# Patient Record
Sex: FEMALE | Race: WHITE | Employment: UNEMPLOYED | URBAN - METROPOLITAN AREA
[De-identification: names, ages, dates, MRNs, and addresses within clinical notes are randomized per-mention and may not be internally consistent; named-entity substitution may affect disease eponyms.]

---

## 2017-07-05 ENCOUNTER — OFFICE VISIT (OUTPATIENT)
Dept: INTERNAL MEDICINE CLINIC | Age: 47
End: 2017-07-05

## 2017-07-05 VITALS
RESPIRATION RATE: 18 BRPM | BODY MASS INDEX: 27.64 KG/M2 | DIASTOLIC BLOOD PRESSURE: 74 MMHG | TEMPERATURE: 98.6 F | OXYGEN SATURATION: 98 % | HEIGHT: 63 IN | WEIGHT: 156 LBS | HEART RATE: 65 BPM | SYSTOLIC BLOOD PRESSURE: 109 MMHG

## 2017-07-05 DIAGNOSIS — F43.9 SITUATIONAL STRESS: Primary | ICD-10-CM

## 2017-07-05 DIAGNOSIS — E07.9 THYROID DISEASE: ICD-10-CM

## 2017-07-05 RX ORDER — LEVOTHYROXINE SODIUM 50 UG/1
50 TABLET ORAL
Qty: 90 TAB | Refills: 0 | Status: SHIPPED | OUTPATIENT
Start: 2017-07-05 | End: 2018-01-01 | Stop reason: SDUPTHER

## 2017-07-05 RX ORDER — LEVOTHYROXINE SODIUM 50 UG/1
50 TABLET ORAL
Qty: 90 TAB | Refills: 0 | Status: SHIPPED | OUTPATIENT
Start: 2017-07-05 | End: 2017-07-05 | Stop reason: SDUPTHER

## 2017-07-05 NOTE — MR AVS SNAPSHOT
Visit Information Date & Time Provider Department Dept. Phone Encounter #  
 7/5/2017 11:45 AM Tamara Fuentes MD [facility] [address] [id number] Upcoming Health Maintenance Date Due DTaP/Tdap/Td series (1 - Tdap) 10/3/1990 PAP AKA CERVICAL CYTOLOGY 4/18/2016 INFLUENZA AGE 9 TO ADULT 8/1/2017 Allergies as of 7/5/2017  Review Complete On: 7/5/2017 By: Tamara Fuentes MD  
 No Known Allergies Current Immunizations  Reviewed on 11/17/2016 Name Date Influenza Vaccine 11/14/2016 Not reviewed this visit You Were Diagnosed With   
  
 Codes Comments Situational stress    -  Primary ICD-10-CM: F43.9 ICD-9-CM: V62.89 Thyroid disease     ICD-10-CM: E07.9 ICD-9-CM: 246. 9 Vitals BP Pulse Temp Resp Height(growth percentile) Weight(growth percentile) 109/74 65 98.6 °F (37 °C) (Oral) 18 5' 3\" (1.6 m) 156 lb (70.8 kg) SpO2 BMI OB Status Smoking Status 98% 27.63 kg/m2 IUD Never Smoker Vitals History BMI and BSA Data Body Mass Index Body Surface Area  
 27.63 kg/m 2 1.77 m 2 Preferred Pharmacy Pharmacy Name Phone [id number]- [place], [address] AT [address] [phone] Your Updated Medication List  
  
   
This list is accurate as of: 7/5/17 12:31 PM.  Always use your most recent med list.  
  
  
  
  
 aspirin 81 mg tablet Take 81 mg by mouth. atorvastatin 40 mg tablet Commonly known as:  LIPITOR Take 1 Tab by mouth daily. citalopram 20 mg tablet Commonly known as:  Cloyce Diego Take  by mouth daily. clonazePAM 0.5 mg tablet Commonly known as:  Ly Mortimer Take 1/2 to 1 tab po tid prn. Caution may cause sedation ISOtretinoin 40 mg capsule Commonly known as:  ACCUTANE Take  by mouth.  
  
 levothyroxine 50 mcg tablet Commonly known as:  SYNTHROID  
 Take 1 Tab by mouth Daily (before breakfast). venlafaxine-SR 75 mg capsule Commonly known as:  EFFEXOR-XR Take 1 Cap by mouth daily. Prescriptions Sent to Pharmacy Refills  
 levothyroxine (SYNTHROID) 50 mcg tablet 0 Sig: Take 1 Tab by mouth Daily (before breakfast). Class: Normal  
 Pharmacy: [address] #: [phone] Route: Oral  
  
We Performed the Following REFERRAL TO PSYCHIATRY [REF91 Custom] To-Do List   
 07/05/2017 Lab:  TSH 3RD GENERATION   
  
 09/05/2017 Lab:  TSH 3RD GENERATION Referral Information Referral ID Referred By Referred To  
  
 [id number] Tristan Gilliland [address] [facility] [address] [place] [address] Phone: [phone] Fax: [phone] Visits Status Start Date End Date 1 New Request 7/5/17 7/5/18 If your referral has a status of pending review or denied, additional information will be sent to support the outcome of this decision. Patient Instructions Thyroid-Stimulating Hormone (TSH) Test: About This Test 
What is it? A thyroid-stimulating hormone (TSH) test is one of several blood tests used to check for thyroid gland problems. TSH causes the thyroid gland to make other important hormones that help control your body's metabolism. Why is this test done? This test is done to: · Find out whether the thyroid gland is working properly. · Find out if a problem with the thyroid is causing symptoms such as tiredness, weight gain, or weight loss. · Keep track of how well thyroid treatment is working. How can you prepare for the test? 
· In general, you dont need to prepare before having this test. Your doctor may give you some specific instructions. What happens during the test? 
· A health professional takes a sample of your blood. What else should you know about the test? 
· This test may be done at the same time as tests to measure other thyroid hormones. · Your results will include an explanation of what a \"normal\" result is. This is called a \"reference range. \" It is just a guide. Your doctor will evaluate your results based on your health and other factors. This means that a value that falls outside the normal values listed may still be normal for you. How long does the test take? · The test will take a few minutes. What happens after the test? 
· You will probably be able to go home right away. · You can go back to your usual activities right away. Follow-up care is a key part of your treatment and safety. Be sure to make and go to all appointments, and call your doctor if you are having problems. It's also a good idea to keep a list of the medicines you take. Ask your doctor when you can expect to have your test results. Where can you learn more? Go to http://ashley-mark.info/. Enter O695 in the search box to learn more about \"Thyroid-Stimulating Hormone (TSH) Test: About This Test.\" Current as of: July 28, 2016 Content Version: 11.3 © 9112-2390 Textbook[employer].com Textbook Time. Care instructions adapted under license by [employer] (which disclaims liability or warranty for this information). If you have questions about a medical condition or this instruction, always ask your healthcare professional. [place] [address] any warranty or liability for your use of this information. Introducing [facility]! Dear Coy Larson: Thank you for requesting a SkyRank account. Our records indicate that you already have an active SkyRank account. You can access your account anytime at [email or web address] Did you know that you can access your hospital and ER discharge instructions at any time in SkyRank?   You can also review all of your test results from your hospital stay or ER visit. Additional Information If you have questions, please visit the Frequently Asked Questions section of the [employer] website at [email or web address] Remember, [employer] is NOT to be used for urgent needs. For medical emergencies, dial 911. Now available from your iPhone and Android! Please provide this summary of care documentation to your next provider. Your primary care clinician is listed as Valerie Saha. If you have any questions after today's visit, please call [phone].

## 2017-07-05 NOTE — PATIENT INSTRUCTIONS
Thyroid-Stimulating Hormone (TSH) Test: About This Test  What is it? A thyroid-stimulating hormone (TSH) test is one of several blood tests used to check for thyroid gland problems. TSH causes the thyroid gland to make other important hormones that help control your body's metabolism. Why is this test done? This test is done to:  · Find out whether the thyroid gland is working properly. · Find out if a problem with the thyroid is causing symptoms such as tiredness, weight gain, or weight loss. · Keep track of how well thyroid treatment is working. How can you prepare for the test?  · In general, you dont need to prepare before having this test. Your doctor may give you some specific instructions. What happens during the test?  · A health professional takes a sample of your blood. What else should you know about the test?  · This test may be done at the same time as tests to measure other thyroid hormones. · Your results will include an explanation of what a \"normal\" result is. This is called a \"reference range. \" It is just a guide. Your doctor will evaluate your results based on your health and other factors. This means that a value that falls outside the normal values listed may still be normal for you. How long does the test take? · The test will take a few minutes. What happens after the test?  · You will probably be able to go home right away. · You can go back to your usual activities right away. Follow-up care is a key part of your treatment and safety. Be sure to make and go to all appointments, and call your doctor if you are having problems. It's also a good idea to keep a list of the medicines you take. Ask your doctor when you can expect to have your test results. Where can you learn more? Go to http://ashley-mark.info/.   Enter L100 in the search box to learn more about \"Thyroid-Stimulating Hormone (TSH) Test: About This Test.\"  Current as of: July 28, 2016  Content Version: 11.3  © 0744-8933 [employer]. Care instructions adapted under license by [employer] (which disclaims liability or warranty for this information). If you have questions about a medical condition or this instruction, always ask your healthcare professional. [address] any warranty or liability for your use of this information.
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
## 2017-07-07 DIAGNOSIS — F43.9 SITUATIONAL STRESS: ICD-10-CM

## 2017-07-07 RX ORDER — CITALOPRAM 20 MG/1
20 TABLET, FILM COATED ORAL DAILY
Qty: 30 TAB | Refills: 1 | Status: SHIPPED | OUTPATIENT
Start: 2017-07-07 | End: 2017-07-07 | Stop reason: SDUPTHER

## 2017-07-07 RX ORDER — CITALOPRAM 20 MG/1
TABLET, FILM COATED ORAL
Qty: 45 TAB | Refills: 1 | Status: SHIPPED | OUTPATIENT
Start: 2017-07-07 | End: 2018-01-11 | Stop reason: SDUPTHER

## 2017-07-12 ENCOUNTER — TELEPHONE (OUTPATIENT)
Dept: INTERNAL MEDICINE CLINIC | Age: 47
End: 2017-07-12

## 2017-07-12 NOTE — TELEPHONE ENCOUNTER
Pt returned call. I advised her of PCP's [employer] message. Pt understood. Will go to the pharmacy to get her medication. She had a missed call from Alina Georgia and she will return her call. Pt will find out the cost for meeting with Alina Dorantes due to her not having insurance. She will send PCP a follow up message via [employer].

## 2017-09-05 DIAGNOSIS — E07.9 THYROID DISEASE: ICD-10-CM

## 2017-12-28 LAB — TSH SERPL DL<=0.005 MIU/L-ACNC: 4.68 UIU/ML (ref 0.45–4.5)

## 2017-12-29 NOTE — PROGRESS NOTES
Based on last rx. It appears that she has not been on her thyroid medication. Please confirm this and I will write for 50 mcg levothyroxine and please recheck in 3 months while on medication.

## 2017-12-29 NOTE — PROGRESS NOTES
Spoke to pt, pt stated she has not been on thyroid medication and she ask to pls send to her pharmacy [facility] on file in her chart.

## 2018-01-01 DIAGNOSIS — E07.9 THYROID DISEASE: ICD-10-CM

## 2018-01-01 RX ORDER — LEVOTHYROXINE SODIUM 50 UG/1
50 TABLET ORAL
Qty: 90 TAB | Refills: 0 | Status: SHIPPED | OUTPATIENT
Start: 2018-01-01 | End: 2018-01-11 | Stop reason: SDUPTHER

## 2018-01-01 NOTE — PROGRESS NOTES
Please contact pt and let her know her thryoid medication was electronically sent and recheck end of march please

## 2018-01-11 DIAGNOSIS — F43.9 SITUATIONAL STRESS: ICD-10-CM

## 2018-01-11 DIAGNOSIS — E07.9 THYROID DISEASE: ICD-10-CM

## 2018-01-11 RX ORDER — ATORVASTATIN CALCIUM 40 MG/1
40 TABLET, FILM COATED ORAL DAILY
Qty: 90 TAB | Refills: 1 | Status: SHIPPED | OUTPATIENT
Start: 2018-01-11 | End: 2022-04-15

## 2018-01-11 RX ORDER — LEVOTHYROXINE SODIUM 50 UG/1
50 TABLET ORAL
Qty: 90 TAB | Refills: 0 | Status: SHIPPED | OUTPATIENT
Start: 2018-01-11 | End: 2022-04-15

## 2018-01-11 RX ORDER — CITALOPRAM 20 MG/1
TABLET, FILM COATED ORAL
Qty: 135 TAB | Refills: 1 | Status: SHIPPED | OUTPATIENT
Start: 2018-01-11 | End: 2022-04-15

## 2018-01-11 NOTE — TELEPHONE ENCOUNTER
From: Gianna Scott  To: Garrison Edgar MD  Sent: 1/11/2018 8:04 AM EST  Subject: Medication Renewal Request    Original authorizing provider: MD Gianan Cameron would like a refill of the following medications:  atorvastatin (LIPITOR) 40 mg tablet [Aparna Ashley MD]  citalopram (CELEXA) 20 mg tablet Garrison Edgar MD]  levothyroxine (SYNTHROID) 50 mcg tablet Garrison Edgar MD]    Preferred pharmacy: Prem Sawyer at [facility] [phone]    Comment:  Could you please fill these prescriptions and send them over the [facility] DR HAI DIXON at [facility]. [phone]. Refills are much cheaper there than at [facility]. Could you also give a 90 day supply of each one as well.  Thanks [employer]'s [employer]

## 2018-03-31 DIAGNOSIS — E07.9 THYROID DISEASE: ICD-10-CM

## 2019-03-06 ENCOUNTER — HOSPITAL ENCOUNTER (OUTPATIENT)
Dept: MAMMOGRAPHY | Age: 49
Discharge: HOME | End: 2019-03-06

## 2019-03-06 ENCOUNTER — OFFICE VISIT (OUTPATIENT)
Dept: FAMILY PLANNING/WOMEN'S HEALTH CLINIC | Age: 49
End: 2019-03-06

## 2019-03-06 ENCOUNTER — HOSPITAL ENCOUNTER (OUTPATIENT)
Dept: LAB | Age: 49
Discharge: HOME | End: 2019-03-06

## 2019-03-06 VITALS — SYSTOLIC BLOOD PRESSURE: 116 MMHG | DIASTOLIC BLOOD PRESSURE: 96 MMHG

## 2019-03-06 DIAGNOSIS — Z12.39 SCREENING BREAST EXAMINATION: ICD-10-CM

## 2019-03-06 DIAGNOSIS — Z01.419 WELL WOMAN EXAM WITH ROUTINE GYNECOLOGICAL EXAM: Primary | ICD-10-CM

## 2019-03-06 PROCEDURE — 88175 CYTOPATH C/V AUTO FLUID REDO: CPT

## 2019-03-06 PROCEDURE — 77067 SCR MAMMO BI INCL CAD: CPT

## 2019-03-06 NOTE — PROGRESS NOTES
Assessment/Plan:    Diagnoses and all orders for this visit:    1. Well woman exam with routine gynecological exam  -     PAP IG, RFX APTIMA HPV ASCUS ([id number]))        Follow-up Disposition: Not on File    [address] [facility] GABRIELE Garcia expressed understanding of this plan. An AVS was printed and given to the patient.      ----------------------------------------------------------------------    Chief Complaint   Patient presents with    Well Woman     EWL visit       History of Present Illness:      Has mirena, needs to have it removed. Strings not visible today. Does have some very mild spotting at times but she might need referral to gyn to get the IUD out  Has breast implants for 10 years, no current problems with them  Due for pap- done today  Single,  from   Her brother has colon cancer, we discussed resources for her to get a colonoscopy if she were to qualify financially for access now. It is not clear if she will qualify, I gave her info on the area resources for medical care for uninsured     Past Medical History:   Diagnosis Date    Hyperlipidemia LDL goal < 130        Current Outpatient Medications   Medication Sig Dispense Refill    atorvastatin (LIPITOR) 40 mg tablet Take 1 Tab by mouth daily. 90 Tab 1    citalopram (CELEXA) 20 mg tablet Take 1 tablet po daily may increase to 1.5 tabs daily if needed. 135 Tab 1    levothyroxine (SYNTHROID) 50 mcg tablet Take 1 Tab by mouth Daily (before breakfast). 90 Tab 0    clonazePAM (KLONOPIN) 0.5 mg tablet Take 1/2 to 1 tab po tid prn. Caution may cause sedation 20 Tab 0    aspirin 81 mg tablet Take 81 mg by mouth.  ISOtretinoin (ACCUTANE) 40 mg capsule Take  by mouth.          No Known Allergies    Social History     Tobacco Use    Smoking status: Never Smoker    Smokeless tobacco: Never Used   Substance Use Topics    Alcohol use: No     Comment: every couple months    Drug use: No       Family History   Problem Relation Age of Onset   [facility] Cancer Sister         non small cell lung cancer         Physical Exam:     Visit Vitals  BP (!) 116/96 (BP 1 Location: Right arm)       A&Ox3  WDWN NAD  Respirations normal and non labored  Breast exam- cherelle neg for mass, dimpling, retractions, skin color changes, nipple changes, implants are intact and in place  Pelvic exam- ext neg for lesion or discharge. Cervix and vagina w/out lesion or discharge.  No IUD strings present  Uterus and adnexal exam neg for mass or tenderness

## 2019-03-06 NOTE — PROGRESS NOTES
EVERY WOMANS LIFE HISTORY QUESTIONNAIRE       No Yes Comments   Has a doctor ever seen or felt anything wrong with your breast? [x]                                  []                                     Have you ever had a breast biopsy? [x]                                  []                                          When and where was last mammogram performed? 2014    Have you ever been told that there was a problem on your mammogram?   No Yes Comments   [x]                                  []                                       Do you have breast implants? No Yes Comments   []                                  [x]                                       When was your last Pap test performed? 2014    Have you ever had an abnormal Pap test?   No Yes Comments   [x]                                  []                                       Have you had a hysterectomy? No Yes Comments (why)   [x]                                  []                                       Have you ever been diagnosed with any type of Cancer   No Yes Comments (type,when,where,type of treatment   [x]                                  []                                          Has a family member been diagnosed with breast or ovarian cancer? No Yes Comments (which family members, and type   [x]                                  []                                       Did your mother take ANDRES? No Yes Unknown   []                                  []                                  X     Do you have a history of HIV exposure? No Yes    [x]                                  []                                         Have you been through menopause?    No Yes Date of LMP   [x]                                  []                                  IUD been in 10 years     Are you taking hormone replacement therapy (HRT)     No Yes Comments   [x]                                  []                                       How many times have you been pregnant? 6     Number of live births ? 4    Are you experiencing any of the following? No Yes Comments   Nipple Discharge [x]                                  []                                     Breast Lump/Masses [x]                                  []                                     Breast Skin Changes [x]                                  []                                          No Yes Comments   Vaginal Discharge [x]                                  []                                     Abnormal/unusual vaginal bleeding [x]                                  []                                         Are you experiencing any other health problems?

## 2019-03-13 ENCOUNTER — HOSPITAL ENCOUNTER (OUTPATIENT)
Dept: MAMMOGRAPHY | Age: 49
Discharge: HOME | End: 2019-03-13

## 2019-03-13 DIAGNOSIS — R92.8 ABNORMAL MAMMOGRAM: ICD-10-CM

## 2019-03-13 PROCEDURE — 76642 ULTRASOUND BREAST LIMITED: CPT

## 2019-03-13 PROCEDURE — 77065 DX MAMMO INCL CAD UNI: CPT

## 2020-05-04 ENCOUNTER — TELEPHONE (OUTPATIENT)
Dept: FAMILY PLANNING/WOMEN'S HEALTH CLINIC | Age: 50
End: 2020-05-04

## 2020-06-24 ENCOUNTER — HOSPITAL ENCOUNTER (OUTPATIENT)
Dept: MAMMOGRAPHY | Age: 50
Discharge: HOME | End: 2020-06-24
Attending: NURSE PRACTITIONER

## 2020-06-24 ENCOUNTER — OFFICE VISIT (OUTPATIENT)
Dept: FAMILY PLANNING/WOMEN'S HEALTH CLINIC | Age: 50
End: 2020-06-24

## 2020-06-24 DIAGNOSIS — Z12.31 SCREENING MAMMOGRAM, ENCOUNTER FOR: Primary | ICD-10-CM

## 2020-06-24 DIAGNOSIS — Z12.31 SCREENING MAMMOGRAM, ENCOUNTER FOR: ICD-10-CM

## 2020-06-24 PROCEDURE — 77067 SCR MAMMO BI INCL CAD: CPT

## 2020-06-24 NOTE — PROGRESS NOTES
HISTORY OF PRESENT ILLNESS  Fausto Osman is a 47 y.o. female here for EWL. HPI  Ms. Sheyla Monsivais, denies abnormal SBE's, performs monthly. H/O bilateral breast implants, saline, placed >10 years ago. No revisions to date. She denies bloating and pelvic pain. In the last year she had some clots in between her cycles that was worked up by her GYN. An US was done that showed cysts in her ovaries. She is scheduled to have another US but is concerned with cost. Mirena IUD has been in place for apprx. 5 years and is due to come out soon but was told it was stuck in the upper part of the uterus and may require more of an intervention to take out. She is about to start her cycle and has been getting light monthly cycles aside from the episode of clots. Declines a pelvic exam d/t cycle about to start. UTD w/Pap- 2019. Non-smoker. Review of Systems   Constitutional: Negative. Gastrointestinal: Negative. Genitourinary: Negative. Physical Exam  Nursing note reviewed. Constitutional:       Appearance: Normal appearance. Chest:      Breasts:         Right: Normal. No swelling, bleeding, inverted nipple, mass, nipple discharge, skin change or tenderness. Left: Normal. No swelling, bleeding, inverted nipple, mass, nipple discharge, skin change or tenderness. Comments: Bilateral breast augmentation. Lymphadenopathy:      Upper Body:      Right upper body: No supraclavicular, axillary or pectoral adenopathy. Left upper body: No supraclavicular, axillary or pectoral adenopathy. Skin:     General: Skin is warm and dry. Neurological:      Mental Status: She is alert and oriented to person, place, and time. Psychiatric:         Mood and Affect: Mood normal.         Behavior: Behavior normal.         ASSESSMENT and PLAN  1. EWL  2. CBE benign  3. Screening mammogram today  4. H/O bilateral breast augmentation, saline implants  5. H/O Mirena IUD  6.  CAV information sheet (phone appts) provided      -re: F/U with pelvic US, IUD removal

## 2020-06-24 NOTE — PROGRESS NOTES
EVERY WOMANS LIFE HISTORY QUESTIONNAIRE       No Yes Comments   Has a doctor ever seen or felt anything wrong with your breast? [x]                                  []                                     Have you ever had a breast biopsy? [x]                                  []                                          When and where was last mammogram performed? 6/2019    Have you ever been told that there was a problem on your mammogram?   No Yes Comments   []                                  [x]                                  AIs were benign     Do you have breast implants? No Yes Comments   []                                  [x]                                       When was your last Pap test performed? 6/2019    Have you ever had an abnormal Pap test?   No Yes Comments   []                                  [x]                                  But turned out fine     Have you had a hysterectomy? No Yes Comments (why)   [x]                                  []                                       Have you been through menopause? No Yes Date of LMP   [x]                                  []                                       Did your mother take ANDRES? No Yes Unknown   [x]                                  []                                       Do you have a history of HIV exposure? No Yes    [x]                                  []                                       Have you ever been diagnosed with any type of Cancer   No Yes Comments (type,when,where,type of treatment   [x]                                  []                                          Has a family member been diagnosed with breast or ovarian cancer?    No Yes Comments (which family members, and type   [x]                                  []                                       Are you taking hormone replacement therapy (HRT)     No Yes Comments   [x]                                  []                                       How many times have you been pregnant? 6     Number of live births ? 4    Are you experiencing any of the following? No Yes Comments   Nipple Discharge [x]                                  []                                     Breast Lump/Masses [x]                                  []                                     Breast Skin Changes [x]                                  []                                          No Yes Comments   Vaginal Discharge [x]                                  []                                     Abnormal/unusual vaginal bleeding [x]                                  []                                         Are you experiencing any other health problems? Age at first period? 14  Age at first birth?   29    Ht--5-3  Wt--145

## 2022-04-15 ENCOUNTER — OFFICE VISIT (OUTPATIENT)
Dept: INTERNAL MEDICINE CLINIC | Age: 52
End: 2022-04-15
Payer: COMMERCIAL

## 2022-04-15 VITALS
HEIGHT: 63 IN | BODY MASS INDEX: 24.13 KG/M2 | TEMPERATURE: 98.3 F | HEART RATE: 58 BPM | RESPIRATION RATE: 14 BRPM | OXYGEN SATURATION: 98 % | WEIGHT: 136.2 LBS | DIASTOLIC BLOOD PRESSURE: 77 MMHG | SYSTOLIC BLOOD PRESSURE: 124 MMHG

## 2022-04-15 DIAGNOSIS — E78.2 MIXED HYPERLIPIDEMIA: ICD-10-CM

## 2022-04-15 DIAGNOSIS — Z00.00 WELL ADULT EXAM: Primary | ICD-10-CM

## 2022-04-15 PROCEDURE — 99386 PREV VISIT NEW AGE 40-64: CPT | Performed by: INTERNAL MEDICINE

## 2022-04-15 RX ORDER — ZOSTER VACCINE RECOMBINANT, ADJUVANTED 50 MCG/0.5
0.5 KIT INTRAMUSCULAR ONCE
Qty: 0.5 ML | Refills: 0 | Status: SHIPPED | OUTPATIENT
Start: 2022-04-15 | End: 2022-04-15

## 2022-04-15 NOTE — PROGRESS NOTES
Alessia Cortez is a 45 y.o. female and presents with Complete Physical and Labs (not fasting)  . Patient presents for her annual.  I have not seen her since July 2017. She is in overall very good health. Her twin daughters are in college in [place] and her other daughter is at [place]. 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 She has been there for 4 months. Subjective:  Cardiovascular Review:  The patient has hyperlipidemia. Diet and Lifestyle: generally follows a low fat low cholesterol diet, exercises sporadically, nonsmoker  Home BP Monitoring: is not measured at home. Pertinent ROS: She is currently not taking medications, no TIA's, no chest pain on exertion, no dyspnea on exertion, no swelling of ankles. She notes some memory changes. Hair thinning  Occurred prior to stress of work  Follows with dermatology      Ovarian cysts  Follows with Dr. Xenia Brunner in place  Still with menses      Review of Systems  Constitutional: negative for fevers, chills, anorexia and weight loss  Eyes:   negative for visual disturbance and irritation  ENT:   negative for tinnitus,sore throat,nasal congestion,ear pains. hoarseness  Respiratory:  negative for cough, hemoptysis, dyspnea,wheezing  CV:   negative for chest pain, palpitations, lower extremity edema  GI:   negative for nausea, vomiting, diarrhea, abdominal pain,melena  Endo:               negative for polyuria,polydipsia,polyphagia,heat intolerance  Genitourinary: negative for frequency, dysuria and hematuria  Integument:  negative for rash and pruritus  Hematologic:  negative for easy bruising and gum/nose bleeding  Musculoskel: negative for myalgias, arthralgias, back pain, muscle weakness, joint pain  Neurological:  negative for headaches, dizziness, vertigo, memory problems and gait   Behavl/Psych: negative for feelings of anxiety, depression, mood changes    Past Medical History:   Diagnosis Date    Hyperlipidemia LDL goal < 130      Past Surgical History:   Procedure Laterality Date    HX BREAST BIOPSY Left     ?  IMPLANT BREAST SILICONE/EQ Bilateral     ? Social History     Socioeconomic History    Marital status:    Tobacco Use    Smoking status: Never Smoker    Smokeless tobacco: Never Used   Substance and Sexual Activity    Alcohol use: No     Comment: every couple months    Drug use: No    Sexual activity: Never   Social History Narrative    Practice  manager with [employer]    Started 2022    9 providers             3 children        2 daughter  twins 26 yo d    1 daughter [facility] 23     Family History   Problem Relation Age of Onset    Cancer Sister         non small cell lung cancer       Current Outpatient Medications   Medication Sig Dispense Refill    atorvastatin (LIPITOR) 40 mg tablet Take 1 Tab by mouth daily. (Patient not taking: Reported on 4/15/2022) 90 Tab 1    citalopram (CELEXA) 20 mg tablet Take 1 tablet po daily may increase to 1.5 tabs daily if needed. (Patient not taking: Reported on 4/15/2022) 135 Tab 1    levothyroxine (SYNTHROID) 50 mcg tablet Take 1 Tab by mouth Daily (before breakfast). (Patient not taking: Reported on 4/15/2022) 90 Tab 0    clonazePAM (KLONOPIN) 0.5 mg tablet Take 1/2 to 1 tab po tid prn. Caution may cause sedation (Patient not taking: Reported on 4/15/2022) 20 Tab 0    aspirin 81 mg tablet Take 81 mg by mouth. (Patient not taking: Reported on 4/15/2022)      ISOtretinoin (ACCUTANE) 40 mg capsule Take  by mouth.  (Patient not taking: Reported on 4/15/2022)       No Known Allergies    Objective:  Visit Vitals  /77 (BP 1 Location: Left upper arm, BP Patient Position: Sitting, BP Cuff Size: Adult)   Pulse (!) 58   Temp 98.3 °F (36.8 °C) (Oral)   Resp 14   Ht 5' 3\" (1.6 m)   Wt 136 lb 3.2 oz (61.8 kg)   SpO2 98%   BMI 24.13 kg/m²     Physical Exam:   General appearance - alert, well appearing, and in no distress  Mental status - alert, oriented to person, place, and time  EYE-ADAM, EOMI, corneas normal, no foreign bodies, visual acuity normal both eyes, no periorbital cellulitis  ENT-ENT exam normal, no neck nodes or sinus tenderness  Nose - normal and patent, no erythema, discharge or polyps  Mouth - mucous membranes moist, pharynx normal without lesions  Neck - supple, no significant adenopathy   Chest - clear to auscultation, no wheezes, rales or rhonchi, symmetric air entry   Heart - normal rate, regular rhythm, normal S1, S2, no murmurs, rubs, clicks or gallops   Abdomen - soft, nontender, nondistended, no masses or organomegaly  Lymph- no adenopathy palpable  Ext-peripheral pulses normal, no pedal edema, no clubbing or cyanosis  Skin-Warm and dry. no hyperpigmentation, vitiligo, or suspicious lesions  Neuro -alert, oriented, normal speech, no focal findings or movement disorder noted  Neck-normal C-spine, no tenderness, full ROM without pain      Results for orders placed or performed in visit on 09/05/17   TSH 3RD GENERATION   Result Value Ref Range    TSH 4.680 (H) 0.450 - 4.500 uIU/mL     Prevention    Cardiovascular profile  Family hx  Exercising:  Weight lifting 2-3 days  and tennis- 1 day  Blood pressure:  Health healthy diet:  Diabetes:  Cholesterol:  Renal function:      Cancer risk profile  Mammogram screen  Lung, some sob  Colonoscopy last year 2021  Skin nonhealing in 2 weeks dermatology Dr. Merlene Cohen abnormal bleeding/discharge/abd pain/pressure dr. Rubio Alatorre, 2022  Hx of ovarian cysts      Thyroid sx    Osteopenia prevention  Calcium 1000mg/day yes  Vitamin D 800iu/day yes    Mental health scale:  Depression  Anxiety  Sleep # of hours:  Energy Level:        Immunizations  TDAP  Pneumonia vaccine  Flu vaccine  Shingles vaccine  HPV        Diagnoses and all orders for this visit:    1. Well adult exam  Patient appears to be in overall very good health.   Follow-up in 1 year  -     HEPATITIS C AB; Future  -     CT HEART W/O CONT WITH CALCIUM; Future  -     CBC W/O DIFF; Future  -     TSH 3RD GENERATION; Future  -     METABOLIC PANEL, COMPREHENSIVE; Future  -     LIPID PANEL; Future  -     T4, FREE; Future  -     NADEEM 3D RONALD W MAMMO BI SCREENING INCL CAD; Future  -     diphth,pertus,acell,,tetanus (BOOSTRIX TDAP) 2.5-8-5 Lf-mcg-Lf/0.5mL susp suspension; 0.5 mL by IntraMUSCular route once for 1 dose.  -     varicella-zoster recombinant, PF, (Shingrix, PF,) 50 mcg/0.5 mL susr injection; 0.5 mL by IntraMUSCular route once for 1 dose. 2. Mixed hyperlipidemia  Patient has a history of hyperlipidemia  We will have her go for her heart CT scan. She can call [phone] to schedule    If her heart CT is positive along with her lipids will start her on Crestor. Her mother is on this. If heart CT is negative and lipids still elevated she would consider low-dose Crestor        Follow-up in 1 year or earlier if needed. -     CT HEART W/O CONT WITH CALCIUM;  Future

## 2022-05-20 ENCOUNTER — HOSPITAL ENCOUNTER (OUTPATIENT)
Dept: CT IMAGING | Age: 52
Discharge: HOME | End: 2022-05-20
Attending: INTERNAL MEDICINE
Payer: SELF-PAY

## 2022-05-20 DIAGNOSIS — E78.2 MIXED HYPERLIPIDEMIA: ICD-10-CM

## 2022-05-20 DIAGNOSIS — Z00.00 WELL ADULT EXAM: ICD-10-CM

## 2022-05-20 PROCEDURE — 75571 CT HRT W/O DYE W/CA TEST: CPT

## 2022-06-07 DIAGNOSIS — L65.9 HAIR LOSS: Primary | ICD-10-CM

## 2022-06-11 ENCOUNTER — PATIENT MESSAGE (OUTPATIENT)
Dept: INTERNAL MEDICINE CLINIC | Age: 52
End: 2022-06-11

## 2022-06-11 LAB
ALBUMIN SERPL-MCNC: 4.1 G/DL (ref 3.8–4.9)
ALBUMIN/GLOB SERPL: 1.5 {RATIO} (ref 1.2–2.2)
ALP SERPL-CCNC: 76 IU/L (ref 44–121)
ALT SERPL-CCNC: 9 IU/L (ref 0–32)
ANA SER QL: NEGATIVE
AST SERPL-CCNC: 13 IU/L (ref 0–40)
BILIRUB SERPL-MCNC: 0.4 MG/DL (ref 0–1.2)
BUN SERPL-MCNC: 15 MG/DL (ref 6–24)
BUN/CREAT SERPL: 15 (ref 9–23)
CALCIUM SERPL-MCNC: 9.4 MG/DL (ref 8.7–10.2)
CHLORIDE SERPL-SCNC: 103 MMOL/L (ref 96–106)
CHOLEST SERPL-MCNC: 277 MG/DL (ref 100–199)
CO2 SERPL-SCNC: 26 MMOL/L (ref 20–29)
CREAT SERPL-MCNC: 1.01 MG/DL (ref 0.57–1)
EGFR: 67 ML/MIN/1.73
ERYTHROCYTE [DISTWIDTH] IN BLOOD BY AUTOMATED COUNT: 13.4 % (ref 11.7–15.4)
FERRITIN SERPL-MCNC: 22 NG/ML (ref 15–150)
FOLATE SERPL-MCNC: 4.3 NG/ML
GLOBULIN SER CALC-MCNC: 2.7 G/DL (ref 1.5–4.5)
GLUCOSE SERPL-MCNC: 90 MG/DL (ref 65–99)
HCT VFR BLD AUTO: 43.3 % (ref 34–46.6)
HCV AB S/CO SERPL IA: <0.1 S/CO RATIO (ref 0–0.9)
HDLC SERPL-MCNC: 57 MG/DL
HGB BLD-MCNC: 14.5 G/DL (ref 11.1–15.9)
IMP & REVIEW OF LAB RESULTS: NORMAL
IRON SERPL-MCNC: 91 UG/DL (ref 27–159)
LDLC SERPL CALC-MCNC: 207 MG/DL (ref 0–99)
MCH RBC QN AUTO: 31.2 PG (ref 26.6–33)
MCHC RBC AUTO-ENTMCNC: 33.5 G/DL (ref 31.5–35.7)
MCV RBC AUTO: 93 FL (ref 79–97)
PLATELET # BLD AUTO: 227 X10E3/UL (ref 150–450)
POTASSIUM SERPL-SCNC: 4.2 MMOL/L (ref 3.5–5.2)
PROT SERPL-MCNC: 6.8 G/DL (ref 6–8.5)
RBC # BLD AUTO: 4.65 X10E6/UL (ref 3.77–5.28)
RPR SER QL: NON REACTIVE
SODIUM SERPL-SCNC: 140 MMOL/L (ref 134–144)
T4 FREE SERPL-MCNC: 1.11 NG/DL (ref 0.82–1.77)
TRIGL SERPL-MCNC: 78 MG/DL (ref 0–149)
TSH SERPL DL<=0.005 MIU/L-ACNC: 4.28 UIU/ML (ref 0.45–4.5)
VIT B12 SERPL-MCNC: 215 PG/ML (ref 232–1245)
VLDLC SERPL CALC-MCNC: 13 MG/DL (ref 5–40)
WBC # BLD AUTO: 5.7 X10E3/UL (ref 3.4–10.8)

## 2022-06-11 RX ORDER — LANOLIN ALCOHOL/MO/W.PET/CERES
CREAM (GRAM) TOPICAL
Qty: 90 TABLET | Refills: 1 | Status: SHIPPED | OUTPATIENT
Start: 2022-06-11 | End: 2022-06-16 | Stop reason: SDUPTHER

## 2022-06-16 RX ORDER — LANOLIN ALCOHOL/MO/W.PET/CERES
CREAM (GRAM) TOPICAL
Qty: 90 TABLET | Refills: 1 | Status: SHIPPED | OUTPATIENT
Start: 2022-06-16 | End: 2022-07-17

## 2022-06-16 NOTE — TELEPHONE ENCOUNTER
From: Mary Jimenez MD  To: Michelle Centeno  Sent: 6/11/2022 4:45 PM EDT  Subject: Labs, B12 slightly low    Hi Antonia,  Thanks for coming in for your visit and for following up on your labs. I just sent you a letter. Your B12 level was slightly low so I sent in a prescription to your pharmacy. Other than your B12 your other labs appear to be in range and likely would not account for hair loss. Please follow-up with dermatology to get their expertise. Please show them these labs as well so they can evaluate and do additional if needed. Thanks again for coming in and take care.  Let me know if you have any questions.   -Dr. Romana Ora

## 2022-06-17 ENCOUNTER — HOSPITAL ENCOUNTER (OUTPATIENT)
Dept: MAMMOGRAPHY | Age: 52
Discharge: HOME | End: 2022-06-17
Attending: INTERNAL MEDICINE
Payer: COMMERCIAL

## 2022-06-17 DIAGNOSIS — Z00.00 WELL ADULT EXAM: ICD-10-CM

## 2022-06-17 PROCEDURE — 77063 BREAST TOMOSYNTHESIS BI: CPT

## 2022-07-17 RX ORDER — LANOLIN ALCOHOL/MO/W.PET/CERES
CREAM (GRAM) TOPICAL
Qty: 90 TABLET | Refills: 1 | Status: SHIPPED | OUTPATIENT
Start: 2022-07-17

## 2023-07-05 ENCOUNTER — OFFICE VISIT (OUTPATIENT)
Age: 53
End: 2023-07-05
Payer: COMMERCIAL

## 2023-07-05 VITALS
RESPIRATION RATE: 14 BRPM | WEIGHT: 153 LBS | HEART RATE: 64 BPM | OXYGEN SATURATION: 98 % | BODY MASS INDEX: 27.11 KG/M2 | HEIGHT: 63 IN | DIASTOLIC BLOOD PRESSURE: 80 MMHG | SYSTOLIC BLOOD PRESSURE: 116 MMHG

## 2023-07-05 DIAGNOSIS — E78.2 MIXED HYPERLIPIDEMIA: ICD-10-CM

## 2023-07-05 DIAGNOSIS — Z00.00 WELL ADULT EXAM: ICD-10-CM

## 2023-07-05 DIAGNOSIS — Z00.00 WELL ADULT EXAM: Primary | ICD-10-CM

## 2023-07-05 PROCEDURE — 90715 TDAP VACCINE 7 YRS/> IM: CPT | Performed by: INTERNAL MEDICINE

## 2023-07-05 PROCEDURE — 90471 IMMUNIZATION ADMIN: CPT | Performed by: INTERNAL MEDICINE

## 2023-07-05 PROCEDURE — 99213 OFFICE O/P EST LOW 20 MIN: CPT | Performed by: INTERNAL MEDICINE

## 2023-07-05 PROCEDURE — 99396 PREV VISIT EST AGE 40-64: CPT | Performed by: INTERNAL MEDICINE

## 2023-07-05 RX ORDER — PLECANATIDE 3 MG/1
TABLET ORAL
COMMUNITY

## 2023-07-05 RX ORDER — ZOSTER VACCINE RECOMBINANT, ADJUVANTED 50 MCG/0.5
0.5 KIT INTRAMUSCULAR SEE ADMIN INSTRUCTIONS
Qty: 0.5 ML | Refills: 1 | Status: SHIPPED | OUTPATIENT
Start: 2023-07-05 | End: 2024-01-01

## 2023-07-05 RX ORDER — ROSUVASTATIN CALCIUM 5 MG/1
5 TABLET, COATED ORAL NIGHTLY
Qty: 30 TABLET | Refills: 0 | Status: SHIPPED | OUTPATIENT
Start: 2023-07-05

## 2023-07-05 SDOH — ECONOMIC STABILITY: FOOD INSECURITY: WITHIN THE PAST 12 MONTHS, YOU WORRIED THAT YOUR FOOD WOULD RUN OUT BEFORE YOU GOT MONEY TO BUY MORE.: NEVER TRUE

## 2023-07-05 SDOH — ECONOMIC STABILITY: FOOD INSECURITY: WITHIN THE PAST 12 MONTHS, THE FOOD YOU BOUGHT JUST DIDN'T LAST AND YOU DIDN'T HAVE MONEY TO GET MORE.: NEVER TRUE

## 2023-07-05 SDOH — ECONOMIC STABILITY: INCOME INSECURITY: HOW HARD IS IT FOR YOU TO PAY FOR THE VERY BASICS LIKE FOOD, HOUSING, MEDICAL CARE, AND HEATING?: NOT HARD AT ALL

## 2023-07-05 SDOH — ECONOMIC STABILITY: HOUSING INSECURITY
IN THE LAST 12 MONTHS, WAS THERE A TIME WHEN YOU DID NOT HAVE A STEADY PLACE TO SLEEP OR SLEPT IN A SHELTER (INCLUDING NOW)?: NO

## 2023-07-05 ASSESSMENT — PATIENT HEALTH QUESTIONNAIRE - PHQ9
2. FEELING DOWN, DEPRESSED OR HOPELESS: 0
SUM OF ALL RESPONSES TO PHQ QUESTIONS 1-9: 0
SUM OF ALL RESPONSES TO PHQ QUESTIONS 1-9: 0
SUM OF ALL RESPONSES TO PHQ9 QUESTIONS 1 & 2: 0
SUM OF ALL RESPONSES TO PHQ QUESTIONS 1-9: 0
1. LITTLE INTEREST OR PLEASURE IN DOING THINGS: 0
SUM OF ALL RESPONSES TO PHQ QUESTIONS 1-9: 0

## 2023-07-05 NOTE — PROGRESS NOTES
ASSESSMENT & PLAN:  1. Well adult exam  Patient appears to be in overall very good health. She is working full-time and having some fatigue. She notes some increase in weight. She has follow-up with her GYN Dr. Bianka Lobato who follows her for breast and GYN health and will also see her for weight loss. Her TSH was ordered already by her GI doctor  -     rosuvastatin (CRESTOR) 5 MG tablet; Take 1 tablet by mouth nightly, Disp-30 tablet, R-0Normal  -     Lipid Panel; Future  -     Vitamin D 25 Hydroxy; Future  -     Vitamin B12; Future  -     Hemoglobin A1C; Future  -     Follicle Stimulating Hormone; Future  -     zoster recombinant adjuvanted vaccine [facility]) 50 MCG/0.5ML SUSR injection; Inject 0.5 mLs into the muscle See Admin Instructions 1 dose now and repeat in 2-6 months, Disp-0.5 mL, R-1Print  -     Tdap, BOOSTRIX, (age 8 yrs+), IM  -     HIV 1/2 Ag/Ab, 4TH Generation,W Rflx Confirm; Future    2. Mixed hyperlipidemia  Chronically not controlled  Strong family history for heart disease and brother 48 recently with MI  Of interest patient's coronary artery calcification scan was 0 when last checked a year ago  Her LDL has consistently been greater than 190    Discussed with patient and despite normal heart scan women have more risk for microvascular disease  Agrees to take a low-dose rosuvastatin half tablet every other day and if tolerated can increase to a full tablet. Will slowly increase dose to get LDL closer to 100. Ideally her LDL should be less than 100 given her family history and her own cholesterol    -     rosuvastatin (CRESTOR) 5 MG tablet; Take 1 tablet by mouth nightly, Disp-30 tablet, R-0Normal  -     Lipid Panel; Future    Follow-up in 6 months or earlier if needed               Ebony Fischer is a 45 y.o. female and presents with Complete Physical and Labs (not fasting)  .     Patient presents for her physical.  She was seen last year and since last visit reports doing well except

## 2023-07-09 LAB
25(OH)D3+25(OH)D2 SERPL-MCNC: 24.6 NG/ML (ref 30–100)
CHOLEST SERPL-MCNC: 294 MG/DL (ref 100–199)
HDLC SERPL-MCNC: 59 MG/DL
LABORATORY COMMENT REPORT: ABNORMAL
LDLC SERPL CALC-MCNC: 223 MG/DL (ref 0–99)
TRIGL SERPL-MCNC: 77 MG/DL (ref 0–149)
VIT B12 SERPL-MCNC: 382 PG/ML (ref 232–1245)
VLDLC SERPL CALC-MCNC: 12 MG/DL (ref 5–40)

## 2023-07-09 RX ORDER — ERGOCALCIFEROL 1.25 MG/1
50000 CAPSULE ORAL WEEKLY
Qty: 12 CAPSULE | Refills: 0 | Status: SHIPPED | OUTPATIENT
Start: 2023-07-09

## 2023-07-10 LAB
HBA1C MFR BLD: 5.4 % (ref 4.8–5.6)
IMP & REVIEW OF LAB RESULTS: NORMAL

## 2023-07-14 ENCOUNTER — HOSPITAL ENCOUNTER (OUTPATIENT)
Facility: HOSPITAL | Age: 53
Discharge: HOME | End: 2023-07-14
Attending: INTERNAL MEDICINE
Payer: COMMERCIAL

## 2023-07-14 DIAGNOSIS — Z12.31 ENCOUNTER FOR SCREENING MAMMOGRAM FOR MALIGNANT NEOPLASM OF BREAST: ICD-10-CM

## 2023-07-14 PROCEDURE — 77067 SCR MAMMO BI INCL CAD: CPT

## 2023-09-17 DIAGNOSIS — E78.2 MIXED HYPERLIPIDEMIA: ICD-10-CM

## 2023-09-17 DIAGNOSIS — Z00.00 WELL ADULT EXAM: ICD-10-CM

## 2023-09-18 RX ORDER — ROSUVASTATIN CALCIUM 5 MG/1
5 TABLET, COATED ORAL NIGHTLY
Qty: 90 TABLET | Refills: 1 | Status: SHIPPED | OUTPATIENT
Start: 2023-09-18

## 2024-03-05 DIAGNOSIS — Z00.00 WELL ADULT EXAM: ICD-10-CM

## 2024-03-05 DIAGNOSIS — E78.2 MIXED HYPERLIPIDEMIA: ICD-10-CM

## 2024-03-06 RX ORDER — ROSUVASTATIN CALCIUM 5 MG/1
5 TABLET, COATED ORAL NIGHTLY
Qty: 90 TABLET | Refills: 1 | Status: SHIPPED | OUTPATIENT
Start: 2024-03-06

## 2024-05-07 ENCOUNTER — OFFICE VISIT (OUTPATIENT)
Age: 54
End: 2024-05-07
Payer: MEDICAID

## 2024-05-07 VITALS
WEIGHT: 140.8 LBS | BODY MASS INDEX: 24.95 KG/M2 | HEART RATE: 71 BPM | DIASTOLIC BLOOD PRESSURE: 76 MMHG | HEIGHT: 63 IN | OXYGEN SATURATION: 96 % | SYSTOLIC BLOOD PRESSURE: 109 MMHG | RESPIRATION RATE: 14 BRPM

## 2024-05-07 DIAGNOSIS — F43.9 SITUATIONAL STRESS: ICD-10-CM

## 2024-05-07 DIAGNOSIS — E78.2 MIXED HYPERLIPIDEMIA: Primary | ICD-10-CM

## 2024-05-07 DIAGNOSIS — Z12.39 BREAST SCREENING: ICD-10-CM

## 2024-05-07 PROCEDURE — 99214 OFFICE O/P EST MOD 30 MIN: CPT | Performed by: INTERNAL MEDICINE

## 2024-05-07 RX ORDER — CITALOPRAM HYDROBROMIDE 10 MG/1
10 TABLET ORAL DAILY
Qty: 90 TABLET | Refills: 0 | Status: SHIPPED | OUTPATIENT
Start: 2024-05-07

## 2024-05-07 RX ORDER — TENAPANOR HYDROCHLORIDE 53.2 MG/1
TABLET ORAL
COMMUNITY

## 2024-05-07 ASSESSMENT — PATIENT HEALTH QUESTIONNAIRE - PHQ9
SUM OF ALL RESPONSES TO PHQ QUESTIONS 1-9: 0
1. LITTLE INTEREST OR PLEASURE IN DOING THINGS: NOT AT ALL
SUM OF ALL RESPONSES TO PHQ QUESTIONS 1-9: 0
SUM OF ALL RESPONSES TO PHQ QUESTIONS 1-9: 0
2. FEELING DOWN, DEPRESSED OR HOPELESS: NOT AT ALL
SUM OF ALL RESPONSES TO PHQ9 QUESTIONS 1 & 2: 0
SUM OF ALL RESPONSES TO PHQ QUESTIONS 1-9: 0

## 2024-05-07 NOTE — PROGRESS NOTES
ASSESSMENT & PLAN:  1. Mixed hyperlipidemia  LDL reviewed encouraged continued rosuvastatin to decrease her cardiovascular risk  Postmenopausal  -     Comprehensive Metabolic Panel; Future  -     Lipid Panel; Future  2. Breast screening  Due for mammogram-     ANNALEE DIGITAL SCREEN W OR WO CAD BILATERAL; Future  3. 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  Super close relationship with her children  Follow-up with counselor  Gave her Tresa Ken nurse psych NP number     -     citalopram (CELEXA) 10 MG tablet; Take 1 tablet by mouth daily, Disp-90 tablet, R-0Normal.  She has been on 20 mg in the past so encouraged her to increase to 20 mg in a week or 2 if needed.  She will also be working with counselor.    Possible that she has some attention deficit issues however may be manifesting as depression/anxiety may not be optimally controlled.     Renetta Hadley is a 51 y.o. female and presents with Complete Physical and Labs (not fasting)  .    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  Notes that there was some significant staffing issues and staff difficult to control.  She was having difficulty focusing.  Has not been working since January.  Notes that since she has been home has been having difficulty completing a lot of tasks.  Her fourth daughter has attention deficit.    Hyperlipidemia  History of elevated Ldl cholesterol  Her coronary artery calcification scan is normal  Healthy and active mother  Father with MI at 50, brother MI     Low B12  She is not taking B12.  She is taking about 1 meal per day          Review of Systems  Constitutional: negative for fevers, chills, anorexia and weight loss  Eyes:   negative for visual disturbance and irritation  ENT:   negative for tinnitus,sore throat,nasal

## 2024-06-19 ENCOUNTER — PATIENT MESSAGE (OUTPATIENT)
Age: 54
End: 2024-06-19

## 2024-06-19 DIAGNOSIS — Z00.00 WELL ADULT EXAM: Primary | ICD-10-CM

## 2024-06-19 NOTE — TELEPHONE ENCOUNTER
From: Renetta Hadley  To: Dr. Kelli Rollins  Sent: 6/19/2024 8:44 AM EDT  Subject: Additional labs    Ludy were the additional labs I mentioned in the last message ever added to my lab request? Also, can you email [facility] and me a lab request so I can go to the [facility] right up the street from me please.    Thank you  Renetta

## 2024-07-15 ENCOUNTER — HOSPITAL ENCOUNTER (OUTPATIENT)
Facility: HOSPITAL | Age: 54
Discharge: HOME | End: 2024-07-18
Payer: MEDICAID

## 2024-07-15 DIAGNOSIS — Z12.31 VISIT FOR SCREENING MAMMOGRAM: ICD-10-CM

## 2024-07-15 PROCEDURE — 77063 BREAST TOMOSYNTHESIS BI: CPT

## 2024-08-01 LAB
25(OH)D3+25(OH)D2 SERPL-MCNC: 38.6 NG/ML (ref 30–100)
CHOLEST SERPL-MCNC: 290 MG/DL (ref 100–199)
CORTIS AM PEAK SERPL-MCNC: 13.1 UG/DL (ref 6.2–19.4)
HDLC SERPL-MCNC: 59 MG/DL
IMP & REVIEW OF LAB RESULTS: NORMAL
LDLC SERPL CALC-MCNC: 215 MG/DL (ref 0–99)
Lab: ABNORMAL
MAGNESIUM SERPL-MCNC: 2.3 MG/DL (ref 1.6–2.3)
TRIGL SERPL-MCNC: 94 MG/DL (ref 0–149)
TSH SERPL DL<=0.005 MIU/L-ACNC: 4.83 UIU/ML (ref 0.45–4.5)
VIT B12 SERPL-MCNC: 321 PG/ML (ref 232–1245)
VLDLC SERPL CALC-MCNC: 16 MG/DL (ref 5–40)

## 2024-08-02 DIAGNOSIS — F43.9 SITUATIONAL STRESS: ICD-10-CM

## 2024-08-02 RX ORDER — LEVOTHYROXINE SODIUM 0.05 MG/1
50 TABLET ORAL DAILY
Qty: 90 TABLET | Refills: 0 | Status: SHIPPED | OUTPATIENT
Start: 2024-08-02

## 2024-08-04 LAB
TESTOST FREE SERPL-MCNC: 0.7 PG/ML (ref 0–4.2)
TESTOST SERPL-MCNC: 11.9 NG/DL

## 2024-08-05 DIAGNOSIS — E78.2 MIXED HYPERLIPIDEMIA: ICD-10-CM

## 2024-08-05 DIAGNOSIS — Z00.00 WELL ADULT EXAM: ICD-10-CM

## 2024-08-05 RX ORDER — CITALOPRAM HYDROBROMIDE 10 MG/1
10 TABLET ORAL DAILY
Qty: 90 TABLET | Refills: 0 | Status: SHIPPED | OUTPATIENT
Start: 2024-08-05

## 2024-08-05 RX ORDER — ROSUVASTATIN CALCIUM 10 MG/1
10 TABLET, COATED ORAL NIGHTLY
Qty: 90 TABLET | Refills: 0 | Status: SHIPPED | OUTPATIENT
Start: 2024-08-05

## 2024-08-05 NOTE — TELEPHONE ENCOUNTER
Patient called and verified. Informed patient of hypothyroid level and that Levothyroxine was sent into her pharmacy. She stated she is currently taking Rosuvastatin but it does not seem to be helping. Would like to know if dosage need to be changed. Patient also would like a refill on Celexa. Thanks.

## 2024-08-07 NOTE — TELEPHONE ENCOUNTER
Spoke with patient and thankful for the call. Patient has picked up medication yesterday and has made a follow up appointment for 3 months.

## 2024-11-05 ENCOUNTER — OFFICE VISIT (OUTPATIENT)
Age: 54
End: 2024-11-05
Payer: COMMERCIAL

## 2024-11-05 VITALS
HEART RATE: 60 BPM | HEIGHT: 63 IN | WEIGHT: 152 LBS | DIASTOLIC BLOOD PRESSURE: 73 MMHG | BODY MASS INDEX: 26.93 KG/M2 | SYSTOLIC BLOOD PRESSURE: 111 MMHG | RESPIRATION RATE: 14 BRPM | OXYGEN SATURATION: 98 %

## 2024-11-05 DIAGNOSIS — E03.9 ACQUIRED HYPOTHYROIDISM: Primary | ICD-10-CM

## 2024-11-05 DIAGNOSIS — F51.01 PRIMARY INSOMNIA: ICD-10-CM

## 2024-11-05 DIAGNOSIS — E78.2 MIXED HYPERLIPIDEMIA: ICD-10-CM

## 2024-11-05 PROCEDURE — 99214 OFFICE O/P EST MOD 30 MIN: CPT | Performed by: INTERNAL MEDICINE

## 2024-11-05 ASSESSMENT — PATIENT HEALTH QUESTIONNAIRE - PHQ9
SUM OF ALL RESPONSES TO PHQ QUESTIONS 1-9: 0
1. LITTLE INTEREST OR PLEASURE IN DOING THINGS: NOT AT ALL
2. FEELING DOWN, DEPRESSED OR HOPELESS: NOT AT ALL
SUM OF ALL RESPONSES TO PHQ9 QUESTIONS 1 & 2: 0

## 2024-11-05 NOTE — PROGRESS NOTES
4/15/2022) 135 Tab 1    levothyroxine (SYNTHROID) 50 mcg tablet Take 1 Tab by mouth Daily (before breakfast). (Patient not taking: Reported on 4/15/2022) 90 Tab 0    clonazePAM (KLONOPIN) 0.5 mg tablet Take 1/2 to 1 tab po tid prn.  Caution may cause sedation (Patient not taking: Reported on 4/15/2022) 20 Tab 0    aspirin 81 mg tablet Take 81 mg by mouth. (Patient not taking: Reported on 4/15/2022)      ISOtretinoin (ACCUTANE) 40 mg capsule Take  by mouth. (Patient not taking: Reported on 4/15/2022)       No Known Allergies    Objective:  Visit Vitals  /77 (BP 1 Location: Left upper arm, BP Patient Position: Sitting, BP Cuff Size: Adult)   Pulse (!) 58   Temp 98.3 °F (36.8 °C) (Oral)   Resp 14   Ht 5' 3\" (1.6 m)   Wt 136 lb 3.2 oz (61.8 kg)   SpO2 98%   BMI 24.13 kg/m²     Physical Exam:   General appearance - alert, well appearing, and in no distress  Mental status - alert, oriented to person, place, and time  EYE-DANIELA, EOMI, corneas normal, no foreign bodies, visual acuity normal both eyes, no periorbital cellulitis  ENT-ENT exam normal, no neck nodes or sinus tenderness  Nose - normal and patent, no erythema, discharge or polyps  Mouth - mucous membranes moist, pharynx normal without lesions  Neck - supple, no significant adenopathy   Chest - clear to auscultation, no wheezes, rales or rhonchi, symmetric air entry   Heart - normal rate, regular rhythm, normal S1, S2, no murmurs, rubs, clicks or gallops   Abdomen - soft, nontender, nondistended, no masses or organomegaly  Lymph- no adenopathy palpable  Ext-peripheral pulses normal, no pedal edema, no clubbing or cyanosis  Skin-Warm and dry. no hyperpigmentation, vitiligo, or suspicious lesions  Neuro -alert, oriented, normal speech, no focal findings or movement disorder noted  Neck-normal C-spine, no tenderness, full ROM without pain      Results for orders placed or performed in visit on 09/05/17   TSH 3RD GENERATION   Result Value Ref Range    TSH 4.680

## 2024-12-04 DIAGNOSIS — E03.9 ACQUIRED HYPOTHYROIDISM: ICD-10-CM

## 2024-12-04 DIAGNOSIS — E78.2 MIXED HYPERLIPIDEMIA: ICD-10-CM

## 2024-12-05 LAB
ALBUMIN SERPL-MCNC: 4 G/DL (ref 3.5–5)
ALBUMIN/GLOB SERPL: 1.3 (ref 1.1–2.2)
ALP SERPL-CCNC: 102 U/L (ref 45–117)
ALT SERPL-CCNC: 72 U/L (ref 12–78)
ANION GAP SERPL CALC-SCNC: 7 MMOL/L (ref 2–12)
AST SERPL-CCNC: 45 U/L (ref 15–37)
BILIRUB SERPL-MCNC: 0.4 MG/DL (ref 0.2–1)
BUN SERPL-MCNC: 13 MG/DL (ref 6–20)
BUN/CREAT SERPL: 13 (ref 12–20)
CALCIUM SERPL-MCNC: 9.7 MG/DL (ref 8.5–10.1)
CHLORIDE SERPL-SCNC: 106 MMOL/L (ref 97–108)
CHOLEST SERPL-MCNC: 309 MG/DL
CO2 SERPL-SCNC: 30 MMOL/L (ref 21–32)
CREAT SERPL-MCNC: 1 MG/DL (ref 0.55–1.02)
GLOBULIN SER CALC-MCNC: 3.2 G/DL (ref 2–4)
GLUCOSE SERPL-MCNC: 103 MG/DL (ref 65–100)
HDLC SERPL-MCNC: 63 MG/DL
HDLC SERPL: 4.9 (ref 0–5)
LDLC SERPL CALC-MCNC: 221.2 MG/DL (ref 0–100)
POTASSIUM SERPL-SCNC: 4.7 MMOL/L (ref 3.5–5.1)
PROT SERPL-MCNC: 7.2 G/DL (ref 6.4–8.2)
SODIUM SERPL-SCNC: 143 MMOL/L (ref 136–145)
T4 FREE SERPL-MCNC: 0.8 NG/DL (ref 0.8–1.5)
TRIGL SERPL-MCNC: 124 MG/DL
TSH SERPL DL<=0.05 MIU/L-ACNC: 6.26 UIU/ML (ref 0.36–3.74)
VLDLC SERPL CALC-MCNC: 24.8 MG/DL

## 2024-12-06 DIAGNOSIS — E03.9 ACQUIRED HYPOTHYROIDISM: Primary | ICD-10-CM

## 2024-12-06 DIAGNOSIS — E78.2 MIXED HYPERLIPIDEMIA: ICD-10-CM

## 2024-12-06 RX ORDER — EVOLOCUMAB 140 MG/ML
INJECTION, SOLUTION SUBCUTANEOUS
Qty: 2 ADJUSTABLE DOSE PRE-FILLED PEN SYRINGE | Refills: 5 | Status: SHIPPED | OUTPATIENT
Start: 2024-12-06

## 2024-12-06 RX ORDER — LEVOTHYROXINE SODIUM 100 UG/1
100 TABLET ORAL DAILY
Qty: 90 TABLET | Refills: 0 | Status: SHIPPED | OUTPATIENT
Start: 2024-12-06

## 2025-01-08 ENCOUNTER — TELEPHONE (OUTPATIENT)
Age: 55
End: 2025-01-08

## 2025-01-15 RX ORDER — LEVOTHYROXINE SODIUM 100 UG/1
100 TABLET ORAL DAILY
Qty: 90 TABLET | Refills: 0 | OUTPATIENT
Start: 2025-01-15

## 2025-01-30 ENCOUNTER — TELEPHONE (OUTPATIENT)
Age: 55
End: 2025-01-30

## 2025-01-30 NOTE — TELEPHONE ENCOUNTER
MyChart message received from patient stating:    \"New pt w/ severe snoring, possible sleep apnea, irregular breathing\"    Called and LVM requesting that patient call [facility] to schedule initial consultation.

## 2025-02-03 ENCOUNTER — TELEPHONE (OUTPATIENT)
Age: 55
End: 2025-02-03

## 2025-02-03 NOTE — TELEPHONE ENCOUNTER
Called patient to offer earlier appointment with Dr. ROQUE. Patient declined as she would like to schedule initial consultation with Dr. Kevin.

## 2025-02-11 ENCOUNTER — TELEPHONE (OUTPATIENT)
Age: 55
End: 2025-02-11

## 2025-02-11 NOTE — TELEPHONE ENCOUNTER
Earlier NP appointment identified. Called patient to offer appointment. LVM requesting that patient call [facility] to evaluate if there were any other cancellations.

## 2025-02-12 DIAGNOSIS — Z00.00 WELL ADULT EXAM: ICD-10-CM

## 2025-02-12 DIAGNOSIS — E78.2 MIXED HYPERLIPIDEMIA: ICD-10-CM

## 2025-02-12 RX ORDER — ROSUVASTATIN CALCIUM 10 MG/1
10 TABLET, COATED ORAL NIGHTLY
Qty: 90 TABLET | Refills: 0 | Status: SHIPPED | OUTPATIENT
Start: 2025-02-12

## 2025-02-26 ENCOUNTER — OFFICE VISIT (OUTPATIENT)
Facility: CLINIC | Age: 55
End: 2025-02-26
Payer: COMMERCIAL

## 2025-02-26 VITALS
DIASTOLIC BLOOD PRESSURE: 74 MMHG | RESPIRATION RATE: 14 BRPM | OXYGEN SATURATION: 100 % | WEIGHT: 163 LBS | HEART RATE: 69 BPM | BODY MASS INDEX: 28.88 KG/M2 | SYSTOLIC BLOOD PRESSURE: 120 MMHG | HEIGHT: 63 IN

## 2025-02-26 DIAGNOSIS — R06.09 DYSPNEA ON EXERTION: ICD-10-CM

## 2025-02-26 DIAGNOSIS — F51.01 PRIMARY INSOMNIA: ICD-10-CM

## 2025-02-26 DIAGNOSIS — R53.83 FATIGUE, UNSPECIFIED TYPE: Primary | ICD-10-CM

## 2025-02-26 DIAGNOSIS — E03.9 ACQUIRED HYPOTHYROIDISM: ICD-10-CM

## 2025-02-26 DIAGNOSIS — R53.83 FATIGUE, UNSPECIFIED TYPE: ICD-10-CM

## 2025-02-26 PROCEDURE — 99214 OFFICE O/P EST MOD 30 MIN: CPT | Performed by: INTERNAL MEDICINE

## 2025-02-26 SDOH — ECONOMIC STABILITY: FOOD INSECURITY: WITHIN THE PAST 12 MONTHS, THE FOOD YOU BOUGHT JUST DIDN'T LAST AND YOU DIDN'T HAVE MONEY TO GET MORE.: NEVER TRUE

## 2025-02-26 SDOH — ECONOMIC STABILITY: FOOD INSECURITY: WITHIN THE PAST 12 MONTHS, YOU WORRIED THAT YOUR FOOD WOULD RUN OUT BEFORE YOU GOT MONEY TO BUY MORE.: NEVER TRUE

## 2025-02-26 ASSESSMENT — PATIENT HEALTH QUESTIONNAIRE - PHQ9
2. FEELING DOWN, DEPRESSED OR HOPELESS: NOT AT ALL
SUM OF ALL RESPONSES TO PHQ9 QUESTIONS 1 & 2: 0
1. LITTLE INTEREST OR PLEASURE IN DOING THINGS: NOT AT ALL
SUM OF ALL RESPONSES TO PHQ QUESTIONS 1-9: 0

## 2025-02-26 NOTE — PROGRESS NOTES
I think she has an appointment for this in the spring.       JANET - Delmi Meek MD, Pulmonology, [place]  -     Home Sleep Study; Future  5. Weight gain.  Her weight gain could be related to her hypothyroidism or sleep apnea. Her TSH and T4 levels will be rechecked, and a home sleep study will be ordered.   Will get her pulmonary /PFTs and cardiac status stable before more aggressive cardiovascular program.      Chief Complaint   Patient presents with    Medication Problem     Thyroid meds not working       History of Present Illness  The patient is a 54-year-old female who presents for evaluation of hypercholesterolemia, hypothyroidism, fatigue, dyspnea on exertion, and weight gain.    Hypercholesterolemia  She has not yet initiated Repatha (evolocumab) therapy, expressing uncertainty regarding its administration. She has been advised by her pharmacist to consult with her physician about the concurrent use of rosuvastatin (Crestor) and ezetimibe (Zetia). Her insurance has approved coverage for Repatha. She reports no history of tobacco use. Her hypercholesterolemia was first diagnosed in her late 20s, either preceding or following the birth of her twins.  - Onset: Diagnosed in her late 20s.  - Alleviating/Aggravating Factors: Uncertainty regarding Repatha administration; advised to consult about concurrent use of rosuvastatin and ezetimibe.  - Timing: Not yet initiated Repatha therapy.    Weight Gain  The patient has observed an 11-pound weight gain since her last visit, despite increased physical activity and adherence to a healthy diet. She has been engaging in resistance training under the guidance of a  but discontinued this regimen two weeks ago. She also incorporates cardiovascular exercises into her routine and plans to resume playing tennis. She has not participated in any spinning classes. She is considering consulting with her OB-GYN regarding potential hormonal imbalances. She is

## 2025-02-27 LAB
CK SERPL-CCNC: 75 U/L (ref 32–182)
ERYTHROCYTE [DISTWIDTH] IN BLOOD BY AUTOMATED COUNT: 12.5 % (ref 11.7–15.4)
HBA1C MFR BLD: 5.5 % (ref 4.8–5.6)
HCT VFR BLD AUTO: 44.1 % (ref 34–46.6)
HGB BLD-MCNC: 14.8 G/DL (ref 11.1–15.9)
MAGNESIUM SERPL-MCNC: 2.1 MG/DL (ref 1.6–2.3)
MCH RBC QN AUTO: 32.1 PG (ref 26.6–33)
MCHC RBC AUTO-ENTMCNC: 33.6 G/DL (ref 31.5–35.7)
MCV RBC AUTO: 96 FL (ref 79–97)
PLATELET # BLD AUTO: 264 X10E3/UL (ref 150–450)
RBC # BLD AUTO: 4.61 X10E6/UL (ref 3.77–5.28)
T4 FREE SERPL-MCNC: 1.07 NG/DL (ref 0.82–1.77)
TSH SERPL DL<=0.005 MIU/L-ACNC: 2.84 UIU/ML (ref 0.45–4.5)
VIT B12 SERPL-MCNC: 536 PG/ML (ref 232–1245)
WBC # BLD AUTO: 5.5 X10E3/UL (ref 3.4–10.8)

## 2025-03-26 RX ORDER — LEVOTHYROXINE SODIUM 100 UG/1
100 TABLET ORAL DAILY
Qty: 90 TABLET | Refills: 0 | Status: SHIPPED | OUTPATIENT
Start: 2025-03-26

## 2025-04-28 RX ORDER — LEVOTHYROXINE SODIUM 100 UG/1
100 TABLET ORAL DAILY
Qty: 90 TABLET | Refills: 3 | Status: SHIPPED | OUTPATIENT
Start: 2025-04-28

## 2025-06-24 ASSESSMENT — SLEEP AND FATIGUE QUESTIONNAIRES
SELECT ANY OF THE FOLLOWING BEHAVIORS OBSERVED WHILE YOU ARE ASLEEP: LIGHT SNORING
FOSQ SCORE: 15.5
DO YOU HAVE DIFFICULTY BEING AS ACTIVE AS YOU WANT TO BE IN THE MORNING BECAUSE YOU ARE SLEEPY OR TIRED: YES, MODERATE
DO YOU GET TOO LITTLE SLEEP AT NIGHT: YES
HOW LIKELY ARE YOU TO NOD OFF OR FALL ASLEEP WHILE SITTING QUIETLY AFTER LUNCH WITHOUT ALCOHOL: WOULD NEVER DOZE
WHAT TIME DO YOU USUALLY WAKE UP: 06:30
ARE YOU BOTHERED BY WAKING UP TOO EARLY AND NOT BEING ABLE TO GET BACK TO SLEEP: YES
DO YOU HAVE DIFFICULTY OPERATING A MOTOR VEHICLE FOR SHORT DISTANCES (LESS THAN 100 MILES) BECAUSE YOU BECOME SLEEPY: NO
HOW LIKELY ARE YOU TO NOD OFF OR FALL ASLEEP WHILE SITTING AND TALKING TO SOMEONE: WOULD NEVER DOZE
DO YOU HAVE DIFFICULTY CONCENTRATING ON THE THINGS YOU DO BECAUSE YOU ARE SLEEPY OR TIRED: YES, MODERATE
DO YOU HAVE DIFFICULTY BEING AS ACTIVE AS YOU WANT TO BE IN THE EVENING BECAUSE YOU ARE SLEEPY OR TIRED: YES, MODERATE
HOW LIKELY ARE YOU TO NOD OFF OR FALL ASLEEP WHILE LYING DOWN TO REST IN THE AFTERNOON WHEN CIRCUMSTANCES PERMIT: HIGH CHANCE OF DOZING
SELECT ANY OF THE FOLLOWING BEHAVIORS OBSERVED WHILE PATIENT ASLEEP: LOUD SNORING;LIGHT SNORING;PAUSES IN BREATHING
HOW LIKELY ARE YOU TO NOD OFF OR FALL ASLEEP WHILE SITTING QUIETLY AFTER LUNCH WITHOUT ALCOHOL: WOULD NEVER DOZE
AVERAGE NUMBER OF SLEEP HOURS: 6
ESS TOTAL SCORE: 6
DO YOU WORK SHIFTS: NO
DO YOU TAKE NAPS: NO
HOW LIKELY ARE YOU TO NOD OFF OR FALL ASLEEP WHILE WATCHING TV: MODERATE CHANCE OF DOZING
ARE YOU BOTHERED BY WAKING UP TOO EARLY AND NOT BEING ABLE TO GET BACK TO SLEEP: YES
HOW LIKELY ARE YOU TO NOD OFF OR FALL ASLEEP IN A CAR, WHILE STOPPED FOR A FEW MINUTES IN TRAFFIC: WOULD NEVER DOZE
NUMBER OF TIMES YOU WAKE PER NIGHT: 8
DO YOU HAVE DIFFICULTY VISITING YOUR FAMILY OR FRIENDS IN THEIR HOME BECAUSE YOU BECOME SLEEPY OR TIRED: NO
HOW LIKELY ARE YOU TO NOD OFF OR FALL ASLEEP WHILE WATCHING TV: MODERATE CHANCE OF DOZING
HOW LIKELY ARE YOU TO NOD OFF OR FALL ASLEEP WHILE LYING DOWN TO REST IN THE AFTERNOON WHEN CIRCUMSTANCES PERMIT: HIGH CHANCE OF DOZING
DO YOU HAVE DIFFICULTY WATCHING A MOVIE OR VIDEO BECAUSE YOU BECOME SLEEPY OR TIRED: YES, A LITTLE
HOW LIKELY ARE YOU TO NOD OFF OR FALL ASLEEP IN A CAR, WHILE STOPPED FOR A FEW MINUTES IN TRAFFIC: WOULD NEVER DOZE
DO YOU HAVE PROBLEMS WITH FREQUENT AWAKENINGS AT NIGHT: YES
HOW LIKELY ARE YOU TO NOD OFF OR FALL ASLEEP WHILE SITTING AND TALKING TO SOMEONE: WOULD NEVER DOZE
AVERAGE NUMBER OF SLEEP HOURS: 6
DO YOU GENERALLY HAVE DIFFICULTY REMEMBERING THINGS BECAUSE YOU ARE SLEEPY OR TIRED: YES, A LITTLE
HOW LIKELY ARE YOU TO NOD OFF OR FALL ASLEEP WHEN YOU ARE A PASSENGER IN A CAR FOR AN HOUR WITHOUT A BREAK: WOULD NEVER DOZE
HAS YOUR MOOD BEEN AFFECTED BECAUSE YOU ARE SLEEPY OR TIRED: YES, LITTLE
SELECT ANY OF THE FOLLOWING BEHAVIORS OBSERVED WHILE YOU ARE ASLEEP: PAUSES IN BREATHING
SELECT ANY OF THE FOLLOWING BEHAVIORS OBSERVED WHILE YOU ARE ASLEEP: LOUD SNORING
DO YOU GET TOO LITTLE SLEEP AT NIGHT: YES
HOW LIKELY ARE YOU TO NOD OFF OR FALL ASLEEP WHILE SITTING AND READING: SLIGHT CHANCE OF DOZING
HOW LIKELY ARE YOU TO NOD OFF OR FALL ASLEEP WHILE SITTING AND READING: SLIGHT CHANCE OF DOZING
HOW LIKELY ARE YOU TO NOD OFF OR FALL ASLEEP WHEN YOU ARE A PASSENGER IN A CAR FOR AN HOUR WITHOUT A BREAK: WOULD NEVER DOZE
DO YOU HAVE DIFFICULTY OPERATING A MOTOR VEHICLE FOR LONG DISTANCES (GREATER THAN 100 MILES) BECAUSE YOU BECOME SLEEPY: NO
HOW LIKELY ARE YOU TO NOD OFF OR FALL ASLEEP WHILE SITTING INACTIVE IN A PUBLIC PLACE: WOULD NEVER DOZE
HAS YOUR RELATIONSHIP WITH FAMILY, FRIENDS OR WORK COLLEAGUES BEEN AFFECTED BECAUSE YOU ARE SLEEPY OR TIRED: NO
HOW LIKELY ARE YOU TO NOD OFF OR FALL ASLEEP WHILE SITTING INACTIVE IN A PUBLIC PLACE: WOULD NEVER DOZE

## 2025-06-25 ENCOUNTER — OFFICE VISIT (OUTPATIENT)
Age: 55
End: 2025-06-25
Payer: COMMERCIAL

## 2025-06-25 VITALS
BODY MASS INDEX: 28 KG/M2 | OXYGEN SATURATION: 98 % | HEIGHT: 63 IN | SYSTOLIC BLOOD PRESSURE: 104 MMHG | DIASTOLIC BLOOD PRESSURE: 71 MMHG | HEART RATE: 71 BPM | TEMPERATURE: 98.3 F | WEIGHT: 158 LBS

## 2025-06-25 DIAGNOSIS — G47.33 OBSTRUCTIVE SLEEP APNEA (ADULT) (PEDIATRIC): Primary | ICD-10-CM

## 2025-06-25 PROCEDURE — 99203 OFFICE O/P NEW LOW 30 MIN: CPT | Performed by: INTERNAL MEDICINE

## 2025-06-25 RX ORDER — TESTOSTERONE 20.25 MG/1.25G
GEL TOPICAL
COMMUNITY
Start: 2025-06-09

## 2025-06-25 NOTE — PROGRESS NOTES
minutes in traffic 0   [place] Sleepiness Score 6    Neck (Inches) 13       Patient-reported        No Known Allergies      Current Outpatient Medications:     Testosterone 1.62 % GEL, Apply 2 clicks (0.5mg) topically TO hairless SKIN site EVERY DAY. rotate sites, Disp: , Rfl:     tirzepatide (MOUNJARO) 12.5 MG/0.5ML SOAJ injection, inject 20 units subcutaneously once weekly for 4 weeks or as directed, alternating injection sites, Disp: , Rfl:     levothyroxine (SYNTHROID) 100 MCG tablet, Take 1 tablet by mouth once daily, Disp: 90 tablet, Rfl: 3    Evolocumab (REPATHA SURECLICK) 140 MG/ML SOAJ, Inject 140 mg SQ every 14 days per package insert, Disp: 2 Adjustable Dose Pre-filled Pen Syringe, Rfl: 5    Tenapanor HCl (IBSRELA) 50 MG TABS, Take by mouth, Disp: , Rfl:      She  has a past medical history of ADHD (attention deficit hyperactivity disorder), Breast cyst, H pylori, Hyperlipidemia LDL goal < 130, Irritable bowel syndrome, and Sleep apnea.    She  has a past surgical history that includes implant breast silicone/eq (Bilateral); Breast biopsy (Left); Upper gastrointestinal endoscopy (); Breast enhancement surgery; and  section (, , ).    She family history includes Alcohol Abuse in her father; Arthritis in her mother; Cancer in her mother and sister; Colon Cancer in her brother; Diabetes in her maternal grandmother; Early Death in her sister; Gout in her father; Heart Attack in her brother and father; Heart Disease in her brother and father; High Blood Pressure in her brother and father; High Cholesterol in her mother and sister; Hypertension in her father; Osteoarthritis in her mother; Thyroid Disease in her sister and sister.    She  reports that she has never smoked. She has never used smokeless tobacco. She reports current alcohol use of about 1.0 standard drink of alcohol per week. She reports that she does not use drugs.     Review of Systems:  Constitutional:  gradual weight

## 2025-06-25 NOTE — PATIENT INSTRUCTIONS
[address]  [place] [address]  Tel.  [phone]  Fax. [phone] [address]  [place] [address]  Tel.  [phone]  Fax. [phone] [address] [place] [address]  Tel.  [phone]  Fax. [phone]     Sleep Apnea: After Your Visit  Your Care Instructions  Sleep apnea occurs when you frequently stop breathing for 10 seconds or longer during sleep. It can be mild to severe, based on the number of times per hour that you stop breathing or have slowed breathing. Blocked or narrowed airways in your nose, mouth, or throat can cause sleep apnea. Your airway can become blocked when your throat muscles and tongue relax during sleep.  Sleep apnea is common, occurring in 1 out of 20 individuals.  Individuals having any of the following characteristics should be evaluated and treated right away due to high risk and detrimental consequences from untreated sleep apnea:  Obesity  Congestive Heart failure  Atrial Fibrillation  Uncontrolled Hypertension  Type II Diabetes  Night-time Arrhythmias  Stroke  Pulmonary Hypertension  High-risk Driving Populations (pilots, truck drivers, etc.)  Patients Considering Weight-loss Surgery    How do you know you have sleep apnea?  You probably have sleep apnea if you answer 'yes' to 3 or more of the following questions:  S - Have you been told that you Snore?   T - Are you often Tired during the day?  O - Has anyone Observed you stop breathing while sleeping?  P- Do you have (or are being treated for) high blood Pressure?    B - Are you obese (Body Mass Index > 35)?  A - Is your Age 50 years old or older?  N - Is your Neck size greater than 16 inches?  G - Are you male Gender?  A sleep physician can prescribe a breathing device that prevents tissues in the throat from blocking your airway. Or your doctor may recommend using a dental device (oral breathing device) to help keep your airway open. In some cases, surgery may

## 2025-06-27 ENCOUNTER — CLINICAL DOCUMENTATION (OUTPATIENT)
Age: 55
End: 2025-06-27

## 2025-07-03 ENCOUNTER — CLINICAL DOCUMENTATION (OUTPATIENT)
Age: 55
End: 2025-07-03

## 2025-07-28 ENCOUNTER — HOSPITAL ENCOUNTER (OUTPATIENT)
Facility: HOSPITAL | Age: 55
Discharge: HOME | End: 2025-07-31
Payer: COMMERCIAL

## 2025-07-28 VITALS — HEIGHT: 63 IN | BODY MASS INDEX: 27.11 KG/M2 | WEIGHT: 153 LBS

## 2025-07-28 DIAGNOSIS — Z12.31 OTHER SCREENING MAMMOGRAM: ICD-10-CM

## 2025-07-28 PROCEDURE — 77063 BREAST TOMOSYNTHESIS BI: CPT
